# Patient Record
Sex: FEMALE | Race: WHITE | NOT HISPANIC OR LATINO
[De-identification: names, ages, dates, MRNs, and addresses within clinical notes are randomized per-mention and may not be internally consistent; named-entity substitution may affect disease eponyms.]

---

## 2017-01-09 ENCOUNTER — RESULT REVIEW (OUTPATIENT)
Age: 82
End: 2017-01-09

## 2017-01-09 ENCOUNTER — OUTPATIENT (OUTPATIENT)
Dept: OUTPATIENT SERVICES | Facility: HOSPITAL | Age: 82
LOS: 1 days | Discharge: HOME | End: 2017-01-09

## 2017-01-09 DIAGNOSIS — C85.88 OTHER SPECIFIED TYPES OF NON-HODGKIN LYMPHOMA, LYMPH NODES OF MULTIPLE SITES: ICD-10-CM

## 2017-01-10 ENCOUNTER — APPOINTMENT (OUTPATIENT)
Dept: HEMATOLOGY ONCOLOGY | Facility: CLINIC | Age: 82
End: 2017-01-10

## 2017-01-10 ENCOUNTER — LABORATORY RESULT (OUTPATIENT)
Age: 82
End: 2017-01-10

## 2017-01-10 VITALS
SYSTOLIC BLOOD PRESSURE: 118 MMHG | WEIGHT: 160.94 LBS | DIASTOLIC BLOOD PRESSURE: 70 MMHG | TEMPERATURE: 97.9 F | HEART RATE: 57 BPM | RESPIRATION RATE: 17 BRPM | OXYGEN SATURATION: 99 %

## 2017-01-10 LAB
BASOPHILS # BLD AUTO: 0.1 K/UL — SIGNIFICANT CHANGE UP (ref 0–0.2)
BASOPHILS NFR BLD AUTO: 1 % — SIGNIFICANT CHANGE UP (ref 0–2)
EOSINOPHIL # BLD AUTO: 0.2 K/UL — SIGNIFICANT CHANGE UP (ref 0–0.5)
HCT VFR BLD CALC: 42.8 % — SIGNIFICANT CHANGE UP (ref 34.5–45)
HGB BLD-MCNC: 14.2 G/DL — SIGNIFICANT CHANGE UP (ref 11.5–15.5)
LYMPHOCYTES # BLD AUTO: 49 % — HIGH (ref 13–44)
LYMPHOCYTES # BLD AUTO: 7.2 K/UL — HIGH (ref 1–3.3)
MCHC RBC-ENTMCNC: 30.1 PG — SIGNIFICANT CHANGE UP (ref 27–34)
MCHC RBC-ENTMCNC: 33.1 GM/DL — SIGNIFICANT CHANGE UP (ref 32–36)
MCV RBC AUTO: 90.8 FL — SIGNIFICANT CHANGE UP (ref 80–100)
MONOCYTES # BLD AUTO: 0.5 K/UL — SIGNIFICANT CHANGE UP (ref 0–0.9)
MONOCYTES NFR BLD AUTO: 2 % — SIGNIFICANT CHANGE UP (ref 2–14)
NEUTROPHILS # BLD AUTO: 5 K/UL — SIGNIFICANT CHANGE UP (ref 1.8–7.4)
NEUTROPHILS NFR BLD AUTO: 48 % — SIGNIFICANT CHANGE UP (ref 43–77)
PLAT MORPH BLD: NORMAL — SIGNIFICANT CHANGE UP
PLATELET # BLD AUTO: 189 K/UL — SIGNIFICANT CHANGE UP (ref 150–400)
RBC # BLD: 4.72 M/UL — SIGNIFICANT CHANGE UP (ref 3.8–5.2)
RBC # FLD: 12.3 % — SIGNIFICANT CHANGE UP (ref 10.3–14.5)
RBC BLD AUTO: NORMAL — SIGNIFICANT CHANGE UP
WBC # BLD: 13 K/UL — HIGH (ref 3.8–10.5)
WBC # FLD AUTO: 13 K/UL — HIGH (ref 3.8–10.5)

## 2017-03-01 ENCOUNTER — APPOINTMENT (OUTPATIENT)
Dept: OPHTHALMOLOGY | Facility: CLINIC | Age: 82
End: 2017-03-01

## 2017-03-01 DIAGNOSIS — C43.70 MALIGNANT MELANOMA OF UNSPECIFIED LOWER LIMB, INCLUDING HIP: ICD-10-CM

## 2017-05-08 ENCOUNTER — RESULT REVIEW (OUTPATIENT)
Age: 82
End: 2017-05-08

## 2017-05-08 ENCOUNTER — APPOINTMENT (OUTPATIENT)
Dept: OPHTHALMOLOGY | Facility: HOSPITAL | Age: 82
End: 2017-05-08

## 2017-05-08 ENCOUNTER — TRANSCRIPTION ENCOUNTER (OUTPATIENT)
Age: 82
End: 2017-05-08

## 2017-05-08 ENCOUNTER — OUTPATIENT (OUTPATIENT)
Dept: OUTPATIENT SERVICES | Facility: HOSPITAL | Age: 82
LOS: 1 days | End: 2017-05-08
Payer: MEDICARE

## 2017-05-08 VITALS
OXYGEN SATURATION: 99 % | HEIGHT: 62 IN | HEART RATE: 43 BPM | DIASTOLIC BLOOD PRESSURE: 45 MMHG | RESPIRATION RATE: 15 BRPM | TEMPERATURE: 98 F | SYSTOLIC BLOOD PRESSURE: 119 MMHG | WEIGHT: 159.61 LBS

## 2017-05-08 VITALS
DIASTOLIC BLOOD PRESSURE: 57 MMHG | OXYGEN SATURATION: 99 % | RESPIRATION RATE: 20 BRPM | HEART RATE: 52 BPM | SYSTOLIC BLOOD PRESSURE: 116 MMHG

## 2017-05-08 DIAGNOSIS — H18.11 BULLOUS KERATOPATHY, RIGHT EYE: ICD-10-CM

## 2017-05-08 DIAGNOSIS — Z90.710 ACQUIRED ABSENCE OF BOTH CERVIX AND UTERUS: Chronic | ICD-10-CM

## 2017-05-08 DIAGNOSIS — Z87.09 PERSONAL HISTORY OF OTHER DISEASES OF THE RESPIRATORY SYSTEM: Chronic | ICD-10-CM

## 2017-05-08 PROCEDURE — 88312 SPECIAL STAINS GROUP 1: CPT

## 2017-05-08 PROCEDURE — 88304 TISSUE EXAM BY PATHOLOGIST: CPT | Mod: 26

## 2017-05-08 PROCEDURE — 87070 CULTURE OTHR SPECIMN AEROBIC: CPT

## 2017-05-08 PROCEDURE — V2785: CPT

## 2017-05-08 PROCEDURE — 88312 SPECIAL STAINS GROUP 1: CPT | Mod: 26

## 2017-05-08 PROCEDURE — 65757 PREP CORNEAL ENDO ALLOGRAFT: CPT | Mod: RT

## 2017-05-08 PROCEDURE — 88304 TISSUE EXAM BY PATHOLOGIST: CPT

## 2017-05-08 PROCEDURE — 87075 CULTR BACTERIA EXCEPT BLOOD: CPT

## 2017-05-08 PROCEDURE — 65756 CORNEAL TRNSPL ENDOTHELIAL: CPT | Mod: RT

## 2017-05-08 NOTE — ASU DISCHARGE PLAN (ADULT/PEDIATRIC). - NOTIFY
Pain not relieved by Medications/Fever greater than 101/Bleeding that does not stop/Persistent Nausea and Vomiting/Swelling that continues/Numbness, color, or temperature change to extremity

## 2017-05-08 NOTE — ASU DISCHARGE PLAN (ADULT/PEDIATRIC). - SPECIAL INSTRUCTIONS
Please call [phone] if you have any questions or if you have pain or vomiting despite taking Tylenol (after 4:30 PM-9am).  Please call [phone] or [phone] during the day (9am-4:30PM) if you have any questions or concerns or pain or vomiting despite taking Tylenol.    YOU MUST LAY FLAT ON YOUR BACK X 24 HOURS UNLESS EATING OR GOING TO BATHROOM.  ONLY USE THE PILLOW GIVEN TO YOU BY [facility].

## 2017-05-09 ENCOUNTER — APPOINTMENT (OUTPATIENT)
Dept: OPHTHALMOLOGY | Facility: CLINIC | Age: 82
End: 2017-05-09

## 2017-05-09 LAB
GRAM STN FLD: SIGNIFICANT CHANGE UP
SPECIMEN SOURCE: SIGNIFICANT CHANGE UP

## 2017-05-11 LAB — SURGICAL PATHOLOGY FINAL REPORT - CH: SIGNIFICANT CHANGE UP

## 2017-05-16 ENCOUNTER — APPOINTMENT (OUTPATIENT)
Dept: OPHTHALMOLOGY | Facility: CLINIC | Age: 82
End: 2017-05-16

## 2017-05-28 LAB
CULTURE RESULTS: SIGNIFICANT CHANGE UP
SPECIMEN SOURCE: SIGNIFICANT CHANGE UP

## 2017-07-06 ENCOUNTER — APPOINTMENT (OUTPATIENT)
Dept: OPHTHALMOLOGY | Facility: CLINIC | Age: 82
End: 2017-07-06

## 2017-07-11 ENCOUNTER — APPOINTMENT (OUTPATIENT)
Dept: OPHTHALMOLOGY | Facility: CLINIC | Age: 82
End: 2017-07-11

## 2017-10-19 ENCOUNTER — APPOINTMENT (OUTPATIENT)
Dept: OPHTHALMOLOGY | Facility: CLINIC | Age: 82
End: 2017-10-19
Payer: MEDICARE

## 2017-10-19 PROCEDURE — 92286 ANT SGM IMG I&R SPECLR MIC: CPT

## 2017-10-19 PROCEDURE — 92132 CPTRZD OPH DX IMG ANT SGM: CPT

## 2017-10-19 PROCEDURE — 92012 INTRM OPH EXAM EST PATIENT: CPT

## 2018-01-10 PROBLEM — H18.20 PSEUDOPHAKIC CORNEAL EDEMA: Status: ACTIVE | Noted: 2017-03-01

## 2018-01-10 PROBLEM — H35.3110 AGE-RELATED MACULAR DEGENERATION, DRY, RIGHT EYE: Status: ACTIVE | Noted: 2017-03-01

## 2018-01-11 ENCOUNTER — APPOINTMENT (OUTPATIENT)
Dept: OPHTHALMOLOGY | Facility: CLINIC | Age: 83
End: 2018-01-11
Payer: MEDICARE

## 2018-01-11 DIAGNOSIS — H35.3110 NONEXUDATIVE AGE-RELATED MACULAR DEGENERATION, RIGHT EYE, STAGE UNSPECIFIED: ICD-10-CM

## 2018-01-11 DIAGNOSIS — H04.123 DRY EYE SYNDROME OF BILATERAL LACRIMAL GLANDS: ICD-10-CM

## 2018-01-11 DIAGNOSIS — H18.20 UNSPECIFIED CORNEAL EDEMA: ICD-10-CM

## 2018-01-11 DIAGNOSIS — Z94.7 CORNEAL TRANSPLANT STATUS: ICD-10-CM

## 2018-01-11 PROCEDURE — 92286 ANT SGM IMG I&R SPECLR MIC: CPT

## 2018-01-11 PROCEDURE — 92134 CPTRZ OPH DX IMG PST SGM RTA: CPT

## 2018-01-11 PROCEDURE — 92012 INTRM OPH EXAM EST PATIENT: CPT

## 2018-01-17 ENCOUNTER — APPOINTMENT (OUTPATIENT)
Dept: OPHTHALMOLOGY | Facility: CLINIC | Age: 83
End: 2018-01-17
Payer: MEDICARE

## 2018-01-17 DIAGNOSIS — Z78.9 OTHER SPECIFIED HEALTH STATUS: ICD-10-CM

## 2018-01-17 DIAGNOSIS — Z83.518 FAMILY HISTORY OF OTHER SPECIFIED EYE DISORDER: ICD-10-CM

## 2018-01-17 PROCEDURE — 92012 INTRM OPH EXAM EST PATIENT: CPT

## 2018-01-17 RX ORDER — CYANOCOBALAMIN (VITAMIN B-12) 100 MCG
100 TABLET ORAL
Refills: 0 | Status: ACTIVE | COMMUNITY

## 2018-01-17 RX ORDER — MILK THISTLE 150 MG
CAPSULE ORAL
Refills: 0 | Status: ACTIVE | COMMUNITY

## 2018-01-23 ENCOUNTER — RX RENEWAL (OUTPATIENT)
Age: 83
End: 2018-01-23

## 2018-03-09 ENCOUNTER — OUTPATIENT (OUTPATIENT)
Dept: OUTPATIENT SERVICES | Facility: HOSPITAL | Age: 83
LOS: 1 days | Discharge: HOME | End: 2018-03-09

## 2018-03-09 DIAGNOSIS — Z87.09 PERSONAL HISTORY OF OTHER DISEASES OF THE RESPIRATORY SYSTEM: Chronic | ICD-10-CM

## 2018-03-09 DIAGNOSIS — C85.88 OTHER SPECIFIED TYPES OF NON-HODGKIN LYMPHOMA, LYMPH NODES OF MULTIPLE SITES: ICD-10-CM

## 2018-03-09 DIAGNOSIS — Z90.710 ACQUIRED ABSENCE OF BOTH CERVIX AND UTERUS: Chronic | ICD-10-CM

## 2018-03-13 ENCOUNTER — RESULT REVIEW (OUTPATIENT)
Age: 83
End: 2018-03-13

## 2018-03-13 ENCOUNTER — APPOINTMENT (OUTPATIENT)
Dept: HEMATOLOGY ONCOLOGY | Facility: CLINIC | Age: 83
End: 2018-03-13

## 2018-03-13 ENCOUNTER — APPOINTMENT (OUTPATIENT)
Dept: HEMATOLOGY ONCOLOGY | Facility: CLINIC | Age: 83
End: 2018-03-13
Payer: MEDICARE

## 2018-03-13 VITALS
WEIGHT: 161 LBS | HEART RATE: 51 BPM | RESPIRATION RATE: 16 BRPM | SYSTOLIC BLOOD PRESSURE: 141 MMHG | DIASTOLIC BLOOD PRESSURE: 79 MMHG | TEMPERATURE: 98.3 F | OXYGEN SATURATION: 97 %

## 2018-03-13 DIAGNOSIS — C91.10 CHRONIC LYMPHOCYTIC LEUKEMIA OF B-CELL TYPE NOT HAVING ACHIEVED REMISSION: ICD-10-CM

## 2018-03-13 LAB
BASOPHILS # BLD AUTO: 0 K/UL — SIGNIFICANT CHANGE UP (ref 0–0.2)
BASOPHILS NFR BLD AUTO: 2 % — SIGNIFICANT CHANGE UP (ref 0–2)
EOSINOPHIL # BLD AUTO: 0.3 K/UL — SIGNIFICANT CHANGE UP (ref 0–0.5)
EOSINOPHIL NFR BLD AUTO: 1 % — SIGNIFICANT CHANGE UP (ref 0–6)
HCT VFR BLD CALC: 41.2 % — SIGNIFICANT CHANGE UP (ref 34.5–45)
HGB BLD-MCNC: 14.1 G/DL — SIGNIFICANT CHANGE UP (ref 11.5–15.5)
LYMPHOCYTES # BLD AUTO: 70 % — HIGH (ref 13–44)
LYMPHOCYTES # BLD AUTO: 9.9 K/UL — HIGH (ref 1–3.3)
MCHC RBC-ENTMCNC: 30.6 PG — SIGNIFICANT CHANGE UP (ref 27–34)
MCHC RBC-ENTMCNC: 34.2 G/DL — SIGNIFICANT CHANGE UP (ref 32–36)
MCV RBC AUTO: 89.5 FL — SIGNIFICANT CHANGE UP (ref 80–100)
MONOCYTES # BLD AUTO: 0.5 K/UL — SIGNIFICANT CHANGE UP (ref 0–0.9)
MONOCYTES NFR BLD AUTO: 2 % — SIGNIFICANT CHANGE UP (ref 2–14)
NEUTROPHILS # BLD AUTO: 3.3 K/UL — SIGNIFICANT CHANGE UP (ref 1.8–7.4)
NEUTROPHILS NFR BLD AUTO: 25 % — LOW (ref 43–77)
PLAT MORPH BLD: NORMAL — SIGNIFICANT CHANGE UP
PLATELET # BLD AUTO: 172 K/UL — SIGNIFICANT CHANGE UP (ref 150–400)
RBC # BLD: 4.6 M/UL — SIGNIFICANT CHANGE UP (ref 3.8–5.2)
RBC # FLD: 12.2 % — SIGNIFICANT CHANGE UP (ref 10.3–14.5)
RBC BLD AUTO: NORMAL — SIGNIFICANT CHANGE UP
WBC # BLD: 14 K/UL — HIGH (ref 3.8–10.5)
WBC # FLD AUTO: 14 K/UL — HIGH (ref 3.8–10.5)

## 2018-03-13 PROCEDURE — 99214 OFFICE O/P EST MOD 30 MIN: CPT

## 2018-04-19 ENCOUNTER — APPOINTMENT (OUTPATIENT)
Dept: OPHTHALMOLOGY | Facility: CLINIC | Age: 83
End: 2018-04-19

## 2018-04-19 LAB
ALBUMIN SERPL ELPH-MCNC: 4.1 G/DL
ALP BLD-CCNC: 77 U/L
ALT SERPL-CCNC: 12 U/L
ANION GAP SERPL CALC-SCNC: 11 MMOL/L
AST SERPL-CCNC: 13 U/L
BILIRUB SERPL-MCNC: 0.3 MG/DL
BUN SERPL-MCNC: 19 MG/DL
CALCIUM SERPL-MCNC: 9.3 MG/DL
CHLORIDE SERPL-SCNC: 106 MMOL/L
CO2 SERPL-SCNC: 25 MMOL/L
CREAT SERPL-MCNC: 0.68 MG/DL
GLUCOSE SERPL-MCNC: 107 MG/DL
LDH SERPL-CCNC: 176 U/L
POTASSIUM SERPL-SCNC: 5.2 MMOL/L
PROT SERPL-MCNC: 6.3 G/DL
SODIUM SERPL-SCNC: 142 MMOL/L
URATE SERPL-MCNC: 4.5 MG/DL

## 2018-05-09 ENCOUNTER — RX RENEWAL (OUTPATIENT)
Age: 83
End: 2018-05-09

## 2019-03-21 PROBLEM — E03.9 HYPOTHYROIDISM, UNSPECIFIED: Chronic | Status: ACTIVE | Noted: 2017-05-08

## 2019-03-21 PROBLEM — I49.9 CARDIAC ARRHYTHMIA, UNSPECIFIED: Chronic | Status: ACTIVE | Noted: 2017-05-08

## 2019-03-21 PROBLEM — R00.1 BRADYCARDIA, UNSPECIFIED: Chronic | Status: ACTIVE | Noted: 2017-05-08

## 2019-03-21 PROBLEM — K83.0 CHOLANGITIS: Chronic | Status: ACTIVE | Noted: 2017-05-08

## 2019-04-02 ENCOUNTER — OUTPATIENT (OUTPATIENT)
Dept: OUTPATIENT SERVICES | Facility: HOSPITAL | Age: 84
LOS: 1 days | Discharge: HOME | End: 2019-04-02

## 2019-04-02 DIAGNOSIS — C85.88 OTHER SPECIFIED TYPES OF NON-HODGKIN LYMPHOMA, LYMPH NODES OF MULTIPLE SITES: ICD-10-CM

## 2019-04-02 DIAGNOSIS — Z90.710 ACQUIRED ABSENCE OF BOTH CERVIX AND UTERUS: Chronic | ICD-10-CM

## 2019-04-02 DIAGNOSIS — Z87.09 PERSONAL HISTORY OF OTHER DISEASES OF THE RESPIRATORY SYSTEM: Chronic | ICD-10-CM

## 2019-04-11 ENCOUNTER — APPOINTMENT (OUTPATIENT)
Dept: HEMATOLOGY ONCOLOGY | Facility: CLINIC | Age: 84
End: 2019-04-11

## 2019-11-20 ENCOUNTER — APPOINTMENT (OUTPATIENT)
Dept: CARDIOLOGY | Facility: CLINIC | Age: 84
End: 2019-11-20
Payer: MEDICARE

## 2019-11-20 ENCOUNTER — NON-APPOINTMENT (OUTPATIENT)
Age: 84
End: 2019-11-20

## 2019-11-20 VITALS
HEART RATE: 61 BPM | OXYGEN SATURATION: 95 % | SYSTOLIC BLOOD PRESSURE: 143 MMHG | HEIGHT: 63 IN | DIASTOLIC BLOOD PRESSURE: 84 MMHG

## 2019-11-20 VITALS — DIASTOLIC BLOOD PRESSURE: 80 MMHG | SYSTOLIC BLOOD PRESSURE: 140 MMHG

## 2019-11-20 PROCEDURE — 93000 ELECTROCARDIOGRAM COMPLETE: CPT

## 2019-11-20 PROCEDURE — 99204 OFFICE O/P NEW MOD 45 MIN: CPT

## 2019-11-20 RX ORDER — PRAVASTATIN SODIUM 20 MG/1
20 TABLET ORAL DAILY
Refills: 0 | Status: ACTIVE | COMMUNITY
Start: 2019-11-20

## 2019-11-20 RX ORDER — PREDNISOLONE ACETATE 10 MG/ML
1 SUSPENSION/ DROPS OPHTHALMIC TWICE DAILY
Qty: 1 | Refills: 2 | Status: DISCONTINUED | COMMUNITY
Start: 2018-01-11 | End: 2019-11-20

## 2019-11-20 RX ORDER — PSYLLIUM HUSK 0.4 G
CAPSULE ORAL
Refills: 0 | Status: DISCONTINUED | COMMUNITY
End: 2019-11-20

## 2019-11-20 RX ORDER — KETOROLAC TROMETHAMINE 4 MG/ML
0.4 SOLUTION/ DROPS OPHTHALMIC
Qty: 1 | Refills: 5 | Status: DISCONTINUED | COMMUNITY
Start: 2017-04-19 | End: 2019-11-20

## 2019-11-20 RX ORDER — PREDNISOLONE ACETATE 10 MG/ML
1 SUSPENSION/ DROPS OPHTHALMIC TWICE DAILY
Qty: 1 | Refills: 5 | Status: DISCONTINUED | COMMUNITY
Start: 2018-01-23 | End: 2019-11-20

## 2019-11-20 RX ORDER — OFLOXACIN 3 MG/ML
0.3 SOLUTION/ DROPS OPHTHALMIC 4 TIMES DAILY
Qty: 1 | Refills: 5 | Status: DISCONTINUED | COMMUNITY
Start: 2017-04-19 | End: 2019-11-20

## 2019-11-20 RX ORDER — PREDNISOLONE ACETATE 10 MG/ML
1 SUSPENSION/ DROPS OPHTHALMIC
Qty: 1 | Refills: 5 | Status: DISCONTINUED | COMMUNITY
Start: 2017-05-16 | End: 2019-11-20

## 2019-12-06 ENCOUNTER — APPOINTMENT (OUTPATIENT)
Dept: CARDIOLOGY | Facility: CLINIC | Age: 84
End: 2019-12-06
Payer: MEDICARE

## 2019-12-06 PROCEDURE — 93306 TTE W/DOPPLER COMPLETE: CPT

## 2021-10-27 ENCOUNTER — APPOINTMENT (OUTPATIENT)
Dept: HEMATOLOGY ONCOLOGY | Facility: CLINIC | Age: 86
End: 2021-10-27

## 2022-05-02 ENCOUNTER — APPOINTMENT (OUTPATIENT)
Dept: ORTHOPEDIC SURGERY | Facility: CLINIC | Age: 87
End: 2022-05-02
Payer: MEDICARE

## 2022-05-02 VITALS — HEIGHT: 63 IN | BODY MASS INDEX: 28.53 KG/M2 | WEIGHT: 161 LBS

## 2022-05-02 DIAGNOSIS — M22.41 CHONDROMALACIA PATELLAE, RIGHT KNEE: ICD-10-CM

## 2022-05-02 PROCEDURE — 99213 OFFICE O/P EST LOW 20 MIN: CPT

## 2022-05-02 NOTE — DISCUSSION/SUMMARY
[de-identified] : Ice p.r.n.\par Continue Voltaren gel or ibuprofen p.r.n..\par Continue home exercises\par She would like to repeat Triluron injections in both knees.  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

## 2022-05-02 NOTE — HISTORY OF PRESENT ILLNESS
[Gradual] : gradual [3] : 3 [Dull/Aching] : dull/aching [Intermittent] : intermittent [Rest] : rest [Walking/activity] : walking/activity [Sitting] : sitting [Retired] : Work status: retired [de-identified] : The patient feels improvement after completing Triluron injections in both knees.  She has mild occasional pain standing and walking.  Mild to moderate pain after sitting and getting up. The right knee bothers her more than the left.  Doing home exercises. [] : Post Surgical Visit: no [FreeTextEntry1] : B Knees  [de-identified] : 1/17/2022 [de-identified] : Dr. Crisostomo

## 2022-05-02 NOTE — PHYSICAL EXAM
[Normal Mood and Affect] : normal mood and affect [Able to Communicate] : able to communicate [Well Developed] : well developed [Well Nourished] : well nourished [5___] : quadriceps 5[unfilled]/5 [FreeTextEntry3] : Slight swelling on the right [FreeTextEntry8] : Slight effusion on the right [de-identified] : Normal gait [TWNoteComboBox7] : flexion 120 degrees [de-identified] : extension 0 degrees [Bilateral] : foot and ankle bilaterally [] : no calf tenderness [2+] : posterior tibialis pulse: 2+

## 2022-07-11 ENCOUNTER — APPOINTMENT (OUTPATIENT)
Dept: ORTHOPEDIC SURGERY | Facility: CLINIC | Age: 87
End: 2022-07-11

## 2022-07-11 VITALS — BODY MASS INDEX: 28.53 KG/M2 | HEIGHT: 63 IN | WEIGHT: 161 LBS

## 2022-07-11 DIAGNOSIS — M17.0 BILATERAL PRIMARY OSTEOARTHRITIS OF KNEE: ICD-10-CM

## 2022-07-11 DIAGNOSIS — M22.42 CHONDROMALACIA PATELLAE, LEFT KNEE: ICD-10-CM

## 2022-07-11 PROCEDURE — 99213 OFFICE O/P EST LOW 20 MIN: CPT

## 2022-07-11 PROCEDURE — 73562 X-RAY EXAM OF KNEE 3: CPT | Mod: 50

## 2022-07-11 NOTE — DISCUSSION/SUMMARY
[de-identified] : Ice p.r.n.\par Continue Voltaren gel or ibuprofen p.r.n..\par Continue home exercises\par I discussed possibly trying Gelsyn injections if her symptoms worsen.  I gave her a pamphlet.  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

## 2022-07-11 NOTE — HISTORY OF PRESENT ILLNESS
[Gradual] : gradual [3] : 3 [Dull/Aching] : dull/aching [Intermittent] : intermittent [Leisure] : leisure [Rest] : rest [Retired] : Work status: retired [de-identified] : The patient and has been doing home exercises.  She says her knees are not really bothering her that much right now.  Her major issue is some discomfort when she gets up from a seated position.  No significant pain standing and walking.  The right knee bothers her more than the left.  She says she doesn't think the Triluron injections really help her that much [Walking/activity] : walking/activity [Sitting] : sitting [] : Post Surgical Visit: no [FreeTextEntry1] : B Knees  [de-identified] : 1/17/2022 [de-identified] : Dr. Crisostomo

## 2022-09-12 ENCOUNTER — APPOINTMENT (OUTPATIENT)
Dept: ORTHOPEDIC SURGERY | Facility: CLINIC | Age: 87
End: 2022-09-12

## 2023-01-10 ENCOUNTER — NON-APPOINTMENT (OUTPATIENT)
Age: 88
End: 2023-01-10

## 2023-01-10 ENCOUNTER — APPOINTMENT (OUTPATIENT)
Dept: CARDIOLOGY | Facility: CLINIC | Age: 88
End: 2023-01-10
Payer: MEDICARE

## 2023-01-10 VITALS
DIASTOLIC BLOOD PRESSURE: 109 MMHG | HEIGHT: 63 IN | OXYGEN SATURATION: 98 % | HEART RATE: 49 BPM | SYSTOLIC BLOOD PRESSURE: 169 MMHG | BODY MASS INDEX: 28.7 KG/M2 | WEIGHT: 162 LBS

## 2023-01-10 DIAGNOSIS — R94.31 ABNORMAL ELECTROCARDIOGRAM [ECG] [EKG]: ICD-10-CM

## 2023-01-10 PROCEDURE — 93000 ELECTROCARDIOGRAM COMPLETE: CPT

## 2023-01-10 PROCEDURE — 99214 OFFICE O/P EST MOD 30 MIN: CPT

## 2023-01-30 ENCOUNTER — APPOINTMENT (OUTPATIENT)
Dept: CARDIOLOGY | Facility: CLINIC | Age: 88
End: 2023-01-30

## 2023-05-25 ENCOUNTER — APPOINTMENT (OUTPATIENT)
Dept: ORTHOPEDIC SURGERY | Facility: CLINIC | Age: 88
End: 2023-05-25
Payer: MEDICARE

## 2023-05-25 VITALS — HEIGHT: 63 IN | WEIGHT: 162 LBS | BODY MASS INDEX: 28.7 KG/M2

## 2023-05-25 DIAGNOSIS — M17.11 UNILATERAL PRIMARY OSTEOARTHRITIS, RIGHT KNEE: ICD-10-CM

## 2023-05-25 DIAGNOSIS — S89.91XA UNSPECIFIED INJURY OF RIGHT LOWER LEG, INITIAL ENCOUNTER: ICD-10-CM

## 2023-05-25 PROCEDURE — 73562 X-RAY EXAM OF KNEE 3: CPT | Mod: RT

## 2023-05-25 PROCEDURE — 99214 OFFICE O/P EST MOD 30 MIN: CPT

## 2023-05-25 RX ORDER — OXYBUTYNIN CHLORIDE 2.5 MG/1
TABLET ORAL
Refills: 0 | Status: ACTIVE | COMMUNITY

## 2023-05-25 NOTE — IMAGING
[Right] : right knee [de-identified] : Normal gait\par \par Right knee has slight swelling\par Minimal effusion\par Slight medial facet tenderness.  Slight lateral joint line tenderness\par Passive range of motion 0° to 120°\par Ligaments are stable\par Quad strength 5/5\par \par Right leg\par No swelling\par Calf is soft and nontender\par Dorsalis pedis pulse 2+\par  [FreeTextEntry9] : Reviewed and interpreted.  Right knee AP standing, lateral, sunrise views-severe degenerative changes with narrowing of the lateral compartment on AP standing view, spurring patellofemoral joint

## 2023-05-25 NOTE — HISTORY OF PRESENT ILLNESS
[Gradual] : gradual [0] : 0 [Dull/Aching] : dull/aching [Intermittent] : intermittent [Leisure] : leisure [Rest] : rest [Standing] : standing [Retired] : Work status: retired [de-identified] : The patient tripped and fell injuring her right knee about 1 month ago.  She said she had pain in her knee for about a week but then it resolved.  She was using ice and taking ibuprofen prn.  She has mild discomfort and stiffness in her knee.  No pain.  Doing home exercises.  She does not think she fell on her knee when she fell but does not recall exact mechanism [] : Post Surgical Visit: no

## 2023-05-25 NOTE — DISCUSSION/SUMMARY
[de-identified] : Ice p.r.n.\par Continue ibuprofen prn\par Continue home exercises\par Again, I discussed possible Gelsyn injections if she has increased pain\par \par Impression:\par Status post injury right knee 1 month ago\par Severe lateral compartment osteoarthritis right knee\par

## 2023-07-10 ENCOUNTER — APPOINTMENT (OUTPATIENT)
Dept: CARDIOLOGY | Facility: CLINIC | Age: 88
End: 2023-07-10

## 2023-08-21 ENCOUNTER — APPOINTMENT (OUTPATIENT)
Dept: ORTHOPEDIC SURGERY | Facility: CLINIC | Age: 88
End: 2023-08-21
Payer: MEDICARE

## 2023-08-21 ENCOUNTER — RESULT REVIEW (OUTPATIENT)
Age: 88
End: 2023-08-21

## 2023-08-21 VITALS — WEIGHT: 162 LBS | HEIGHT: 63 IN | BODY MASS INDEX: 28.7 KG/M2

## 2023-08-21 DIAGNOSIS — S39.012A STRAIN OF MUSCLE, FASCIA AND TENDON OF LOWER BACK, INITIAL ENCOUNTER: ICD-10-CM

## 2023-08-21 PROCEDURE — 99214 OFFICE O/P EST MOD 30 MIN: CPT

## 2023-08-21 PROCEDURE — 72170 X-RAY EXAM OF PELVIS: CPT

## 2023-08-21 PROCEDURE — 72100 X-RAY EXAM L-S SPINE 2/3 VWS: CPT

## 2023-08-21 NOTE — DISCUSSION/SUMMARY
[de-identified] : Moist heat prn No lifting Continue over-the-counter ibuprofen prn.  Tylenol prn She will have MRI lumbosacral spine  Impression: Status post fall August 11, 2023 Compression deformity L1.  Rule out new fracture Lumbosacral strain/spondylosis

## 2023-08-21 NOTE — HISTORY OF PRESENT ILLNESS
[Lower back] : lower back [Gradual] : gradual [6] : 6 [Dull/Aching] : dull/aching [Intermittent] : intermittent [Walking/activity] : walking/activity [Lying in bed] : lying in bed [de-identified] : Date of injury August 11, 2023 Patient is an 89-year-old female who fell at home landing on her buttocks.  She had onset of back pain which has improved a little.  She has mild to moderate pain with change in position.  No radicular complaints.  No numbness or weakness in her lower extremities.  Taking over-the-counter ibuprofen prn.  Seen today with her daughter,   [] : Post Surgical Visit: no

## 2023-08-21 NOTE — IMAGING
[de-identified] : Normal gait  Lumbosacral spine Inspection normal Mild tenderness lumbar region bilaterally Straight leg raising is negative bilaterally Motor exam lower extremities normal  Both hips Full painless passive range of motion No tenderness  Both legs No swelling Calves are soft and nontender Dorsalis pedis pulse 2+ bilaterally [FreeTextEntry1] : Reviewed and interpreted.  Lumbosacral spine AP and lateral views-moderate to severe multilevel degenerative disc disease.  Mild to moderate compression deformity L1.  Aortic calcification [de-identified] : Reviewed and interpreted.  Pelvis AP-minimal degenerative changes of the hips bilaterally

## 2023-08-21 NOTE — PHYSICAL EXAM
[Able to Communicate] : able to communicate [Well Developed] : well developed [Well Nourished] : well nourished

## 2023-08-22 ENCOUNTER — NON-APPOINTMENT (OUTPATIENT)
Age: 88
End: 2023-08-22

## 2023-08-28 ENCOUNTER — APPOINTMENT (OUTPATIENT)
Dept: ORTHOPEDIC SURGERY | Facility: CLINIC | Age: 88
End: 2023-08-28
Payer: MEDICARE

## 2023-08-28 VITALS — WEIGHT: 162 LBS | HEIGHT: 63 IN | BODY MASS INDEX: 28.7 KG/M2

## 2023-08-28 DIAGNOSIS — R93.7 ABNORMAL FINDINGS ON DIAGNOSTIC IMAGING OF OTHER PARTS OF MUSCULOSKELETAL SYSTEM: ICD-10-CM

## 2023-08-28 DIAGNOSIS — S32.010A WEDGE COMPRESSION FRACTURE OF FIRST LUMBAR VERTEBRA, INITIAL ENCOUNTER FOR CLOSED FRACTURE: ICD-10-CM

## 2023-08-28 PROCEDURE — 99214 OFFICE O/P EST MOD 30 MIN: CPT

## 2023-08-28 NOTE — HISTORY OF PRESENT ILLNESS
[4] : 4 [3] : 3 [Dull/Aching] : dull/aching [de-identified] : Date of injury August 11, 2023  The patient has thoracolumbar fracture brace.  She has mild pain lumbar region with change in position.  As the day goes on.  No radicular complaints.  No numbness or weakness in her lower extremities.  She had MRI lumbosacral spine.  Taking over-the-counter Motrin and Tylenol prn.  Seen today with her daughter Sandi [FreeTextEntry1] : L-spine [de-identified] : brace

## 2023-08-28 NOTE — DATA REVIEWED
[MRI] : MRI [Lumbar Spine] : lumbar spine [I independently reviewed and interpreted images and report] : I independently reviewed and interpreted images and report [FreeTextEntry1] : MRI lumbosacral spine done at Dr. Lucas August 21, 2023 was reviewed. There is multilevel degenerative disc disease. Mild stenosis L1-2 through L4-5. There is compression deformity inferior endplate of L1 with bone marrow edema. Grade 1 spondylolisthesis L5-S1

## 2023-08-28 NOTE — DISCUSSION/SUMMARY
[de-identified] : MRI lumbosacral spine was discussed with the patient and her daughter Moist heat prn No lifting Continue over-the-counter Motrin prn.  Tylenol prn If for any reason she develops any significant neurologic changes, weakness lower extremities, loss of bowel bladder control, advised to go to emergency room immediately She will have NEW X-RAYS LUMBOSACRAL SPINE AP AND LATERAL ON RETURN VISIT  Impression: Status post fall August 11, 2023 Compression fracture L1 Lumbosacral strain/spondylosis

## 2023-08-28 NOTE — IMAGING
[de-identified] : Normal gait  Lumbosacral spine Inspection normal Mild tenderness lumbar region bilaterally Straight leg raising is negative bilaterally Motor exam lower extremities normal  Both hips Full painless passive range of motion No tenderness  Both legs No swelling Calves are soft and nontender Dorsalis pedis pulse 2+ bilaterally

## 2023-09-20 ENCOUNTER — APPOINTMENT (OUTPATIENT)
Dept: ORTHOPEDIC SURGERY | Facility: CLINIC | Age: 88
End: 2023-09-20
Payer: MEDICARE

## 2023-09-20 VITALS — HEIGHT: 63 IN | WEIGHT: 162 LBS | BODY MASS INDEX: 28.7 KG/M2

## 2023-09-20 PROCEDURE — 99213 OFFICE O/P EST LOW 20 MIN: CPT

## 2023-09-20 PROCEDURE — 72100 X-RAY EXAM L-S SPINE 2/3 VWS: CPT

## 2023-10-11 ENCOUNTER — APPOINTMENT (OUTPATIENT)
Dept: ORTHOPEDIC SURGERY | Facility: CLINIC | Age: 88
End: 2023-10-11
Payer: MEDICARE

## 2023-10-11 VITALS — WEIGHT: 162 LBS | BODY MASS INDEX: 28.7 KG/M2 | HEIGHT: 63 IN

## 2023-10-11 PROCEDURE — 72100 X-RAY EXAM L-S SPINE 2/3 VWS: CPT

## 2023-10-11 PROCEDURE — 99213 OFFICE O/P EST LOW 20 MIN: CPT

## 2023-11-16 ENCOUNTER — APPOINTMENT (OUTPATIENT)
Dept: ORTHOPEDIC SURGERY | Facility: CLINIC | Age: 88
End: 2023-11-16
Payer: MEDICARE

## 2023-11-16 VITALS — WEIGHT: 162 LBS | BODY MASS INDEX: 28.7 KG/M2 | HEIGHT: 63 IN

## 2023-11-16 DIAGNOSIS — M47.817 SPONDYLOSIS W/OUT MYELOPATHY OR RADICULOPATHY, LUMBOSACRAL REGION: ICD-10-CM

## 2023-11-16 DIAGNOSIS — S32.010D WEDGE COMPRESSION FRACTURE OF FIRST LUMBAR VERTEBRA, SUBSEQUENT ENCOUNTER FOR FRACTURE WITH ROUTINE HEALING: ICD-10-CM

## 2023-11-16 DIAGNOSIS — S39.012D STRAIN OF MUSCLE, FASCIA AND TENDON OF LOWER BACK, SUBSEQUENT ENCOUNTER: ICD-10-CM

## 2023-11-16 PROCEDURE — 99213 OFFICE O/P EST LOW 20 MIN: CPT

## 2023-11-16 PROCEDURE — 72100 X-RAY EXAM L-S SPINE 2/3 VWS: CPT

## 2024-04-26 NOTE — IMAGING
Pharm ref.    I called the patient, explained my role and he accepts.  This was not a good time for the patient; he stated he will call me back.    Chart reviewed.       [de-identified] : The patient ambulates without difficulty\par \par Right knee has slight swelling\par Mild effusion\par Mild medial facet tenderness.  Mild lateral joint line tenderness\par Passive range of motion 0° to 120°\par Ligaments are stable\par Quad strength 5/5\par \par Left knee no swelling\par Mild medial facet and joint line tenderness\par Passive range of motion 0° to 125°\par Ligaments are stable\par Quad strength 5/5\par \par Legs\par No swelling\par Calves are soft and nontender\par Dorsalis pedis 2+ bilaterally\par \par  [Bilateral] : knee bilaterally [FreeTextEntry9] : Reviewed and interpreted.  Right knee AP standing, lateral, sunrise views-severe degenerative changes with narrowing of the lateral compartment on AP standing view, spurring patellofemoral joint\par \par Reviewed and interpreted.  Left knee AP standing, lateral, sunrise views-mild to moderate tricompartmental degenerative changes

## 2024-06-18 ENCOUNTER — APPOINTMENT (OUTPATIENT)
Dept: CARDIOLOGY | Facility: CLINIC | Age: 89
End: 2024-06-18

## 2024-10-15 ENCOUNTER — APPOINTMENT (OUTPATIENT)
Dept: ORTHOPEDIC SURGERY | Facility: CLINIC | Age: 89
End: 2024-10-15
Payer: MEDICARE

## 2024-10-15 VITALS — WEIGHT: 162 LBS | BODY MASS INDEX: 28.7 KG/M2 | HEIGHT: 63 IN

## 2024-10-15 DIAGNOSIS — S39.012D STRAIN OF MUSCLE, FASCIA AND TENDON OF LOWER BACK, SUBSEQUENT ENCOUNTER: ICD-10-CM

## 2024-10-15 DIAGNOSIS — M47.817 SPONDYLOSIS W/OUT MYELOPATHY OR RADICULOPATHY, LUMBOSACRAL REGION: ICD-10-CM

## 2024-10-15 PROCEDURE — 72100 X-RAY EXAM L-S SPINE 2/3 VWS: CPT

## 2024-10-15 PROCEDURE — 72170 X-RAY EXAM OF PELVIS: CPT

## 2024-10-15 PROCEDURE — 99213 OFFICE O/P EST LOW 20 MIN: CPT

## 2025-04-15 ENCOUNTER — APPOINTMENT (OUTPATIENT)
Dept: ORTHOPEDIC SURGERY | Facility: CLINIC | Age: 89
End: 2025-04-15